# Patient Record
Sex: FEMALE | Race: WHITE | HISPANIC OR LATINO | Employment: UNEMPLOYED | URBAN - METROPOLITAN AREA
[De-identification: names, ages, dates, MRNs, and addresses within clinical notes are randomized per-mention and may not be internally consistent; named-entity substitution may affect disease eponyms.]

---

## 2019-02-20 ENCOUNTER — TRANSCRIBE ORDERS (OUTPATIENT)
Dept: OBSTETRICS AND GYNECOLOGY | Facility: HOSPITAL | Age: 29
End: 2019-02-20

## 2019-02-20 DIAGNOSIS — O35.EXX0 ULTRASOUND RECHECK OF FETAL PYELECTASIS, ANTEPARTUM, NOT APPLICABLE OR UNSPECIFIED FETUS: Primary | ICD-10-CM

## 2019-02-26 ENCOUNTER — APPOINTMENT (OUTPATIENT)
Dept: WOMENS IMAGING | Facility: HOSPITAL | Age: 29
End: 2019-02-26

## 2019-03-08 ENCOUNTER — HOSPITAL ENCOUNTER (OUTPATIENT)
Dept: WOMENS IMAGING | Facility: HOSPITAL | Age: 29
Discharge: HOME | End: 2019-03-08
Admitting: OBSTETRICS & GYNECOLOGY

## 2019-03-08 ENCOUNTER — OFFICE VISIT (OUTPATIENT)
Dept: OBSTETRICS AND GYNECOLOGY | Facility: HOSPITAL | Age: 29
End: 2019-03-08

## 2019-03-08 VITALS
SYSTOLIC BLOOD PRESSURE: 105 MMHG | HEIGHT: 61 IN | BODY MASS INDEX: 50.71 KG/M2 | WEIGHT: 268.6 LBS | DIASTOLIC BLOOD PRESSURE: 62 MMHG

## 2019-03-08 DIAGNOSIS — O35.EXX0 FETAL HYDRONEPHROSIS DURING PREGNANCY, ANTEPARTUM, SINGLE OR UNSPECIFIED FETUS: ICD-10-CM

## 2019-03-08 DIAGNOSIS — O09.299 PREVIOUS STILLBIRTH OR DEMISE, ANTEPARTUM: ICD-10-CM

## 2019-03-08 DIAGNOSIS — E66.9 SUPER OBESITY: ICD-10-CM

## 2019-03-08 DIAGNOSIS — Z98.891 PREVIOUS CESAREAN SECTION: Primary | ICD-10-CM

## 2019-03-08 DIAGNOSIS — O35.EXX0 ULTRASOUND RECHECK OF FETAL PYELECTASIS, ANTEPARTUM, NOT APPLICABLE OR UNSPECIFIED FETUS: ICD-10-CM

## 2019-03-08 PROCEDURE — 76811 OB US DETAILED SNGL FETUS: CPT

## 2019-03-08 PROCEDURE — 76811 OB US DETAILED SNGL FETUS: CPT | Performed by: OBSTETRICS & GYNECOLOGY

## 2019-03-08 RX ORDER — PRENATAL WITH FERROUS FUM AND FOLIC ACID 3080; 920; 120; 400; 22; 1.84; 3; 20; 10; 1; 12; 200; 27; 25; 2 [IU]/1; [IU]/1; MG/1; [IU]/1; MG/1; MG/1; MG/1; MG/1; MG/1; MG/1; UG/1; MG/1; MG/1; MG/1; MG/1
1 TABLET ORAL DAILY
COMMUNITY

## 2019-03-08 NOTE — PROGRESS NOTES
Documentation of the ultasound findings, images, and interpretations with be available in the patient's Viewpoint report located in the Chart Review Imaging tab in [facility]mi.

## 2019-03-08 NOTE — PROGRESS NOTES
Late prenatal care; had positive UPT in November but \[facility]\"" [facility] didn't give her an appointment until February. Next OB visit 3/11/2019. America Rodríguez with [facility] present to translate.